# Patient Record
Sex: FEMALE | Race: WHITE | NOT HISPANIC OR LATINO | Employment: FULL TIME | URBAN - METROPOLITAN AREA
[De-identification: names, ages, dates, MRNs, and addresses within clinical notes are randomized per-mention and may not be internally consistent; named-entity substitution may affect disease eponyms.]

---

## 2023-09-05 ENCOUNTER — CONSULT (OUTPATIENT)
Dept: ONCOLOGY | Facility: CLINIC | Age: 68
End: 2023-09-05
Payer: COMMERCIAL

## 2023-09-05 ENCOUNTER — LAB (OUTPATIENT)
Dept: LAB | Facility: HOSPITAL | Age: 68
End: 2023-09-05
Payer: COMMERCIAL

## 2023-09-05 VITALS
HEIGHT: 61 IN | RESPIRATION RATE: 14 BRPM | TEMPERATURE: 98.7 F | SYSTOLIC BLOOD PRESSURE: 158 MMHG | HEART RATE: 84 BPM | BODY MASS INDEX: 20.01 KG/M2 | OXYGEN SATURATION: 99 % | DIASTOLIC BLOOD PRESSURE: 80 MMHG | WEIGHT: 106 LBS

## 2023-09-05 DIAGNOSIS — D64.89 ANEMIA DUE TO OTHER CAUSE, NOT CLASSIFIED: ICD-10-CM

## 2023-09-05 DIAGNOSIS — D64.89 ANEMIA DUE TO OTHER CAUSE, NOT CLASSIFIED: Primary | ICD-10-CM

## 2023-09-05 LAB
ALBUMIN SERPL-MCNC: 4.5 G/DL (ref 3.5–5.2)
ALBUMIN/GLOB SERPL: 1.4 G/DL
ALP SERPL-CCNC: 90 U/L (ref 39–117)
ALT SERPL W P-5'-P-CCNC: 13 U/L (ref 1–33)
ANION GAP SERPL CALCULATED.3IONS-SCNC: 10 MMOL/L (ref 5–15)
AST SERPL-CCNC: 21 U/L (ref 1–32)
B2 MICROGLOB SERPL-MCNC: 2.7 MG/L (ref 0.8–2.2)
BASOPHILS # BLD AUTO: 0.03 10*3/MM3 (ref 0–0.2)
BASOPHILS NFR BLD AUTO: 0.7 % (ref 0–1.5)
BILIRUB SERPL-MCNC: 0.2 MG/DL (ref 0–1.2)
BUN SERPL-MCNC: 13 MG/DL (ref 8–23)
BUN/CREAT SERPL: 16.3 (ref 7–25)
CALCIUM SPEC-SCNC: 9.6 MG/DL (ref 8.6–10.5)
CHLORIDE SERPL-SCNC: 108 MMOL/L (ref 98–107)
CO2 SERPL-SCNC: 25 MMOL/L (ref 22–29)
CREAT SERPL-MCNC: 0.8 MG/DL (ref 0.57–1)
DEPRECATED RDW RBC AUTO: 54 FL (ref 37–54)
EGFRCR SERPLBLD CKD-EPI 2021: 79.9 ML/MIN/1.73
EOSINOPHIL # BLD AUTO: 0.09 10*3/MM3 (ref 0–0.4)
EOSINOPHIL NFR BLD AUTO: 2 % (ref 0.3–6.2)
ERYTHROCYTE [DISTWIDTH] IN BLOOD BY AUTOMATED COUNT: 15.4 % (ref 12.3–15.4)
FERRITIN SERPL-MCNC: 46.31 NG/ML (ref 13–150)
FOLATE SERPL-MCNC: >20 NG/ML (ref 4.78–24.2)
GLOBULIN UR ELPH-MCNC: 3.3 GM/DL
GLUCOSE SERPL-MCNC: 94 MG/DL (ref 65–99)
HAPTOGLOB SERPL-MCNC: 71 MG/DL (ref 30–200)
HCT VFR BLD AUTO: 35.7 % (ref 34–46.6)
HGB BLD-MCNC: 11.1 G/DL (ref 12–15.9)
IMM GRANULOCYTES # BLD AUTO: 0.01 10*3/MM3 (ref 0–0.05)
IMM GRANULOCYTES NFR BLD AUTO: 0.2 % (ref 0–0.5)
IRON 24H UR-MRATE: 63 MCG/DL (ref 37–145)
IRON SATN MFR SERPL: 14 % (ref 20–50)
LDH SERPL-CCNC: 255 U/L (ref 135–214)
LYMPHOCYTES # BLD AUTO: 1.06 10*3/MM3 (ref 0.7–3.1)
LYMPHOCYTES NFR BLD AUTO: 23.7 % (ref 19.6–45.3)
MCH RBC QN AUTO: 29.3 PG (ref 26.6–33)
MCHC RBC AUTO-ENTMCNC: 31.1 G/DL (ref 31.5–35.7)
MCV RBC AUTO: 94.2 FL (ref 79–97)
MONOCYTES # BLD AUTO: 0.35 10*3/MM3 (ref 0.1–0.9)
MONOCYTES NFR BLD AUTO: 7.8 % (ref 5–12)
NEUTROPHILS NFR BLD AUTO: 2.93 10*3/MM3 (ref 1.7–7)
NEUTROPHILS NFR BLD AUTO: 65.6 % (ref 42.7–76)
PLATELET # BLD AUTO: 278 10*3/MM3 (ref 140–450)
PMV BLD AUTO: 10.9 FL (ref 6–12)
POTASSIUM SERPL-SCNC: 3.9 MMOL/L (ref 3.5–5.2)
PROT SERPL-MCNC: 7.8 G/DL (ref 6–8.5)
RBC # BLD AUTO: 3.79 10*6/MM3 (ref 3.77–5.28)
RETICS # AUTO: 0.04 10*6/MM3 (ref 0.02–0.13)
RETICS/RBC NFR AUTO: 0.99 % (ref 0.7–1.9)
SODIUM SERPL-SCNC: 143 MMOL/L (ref 136–145)
TIBC SERPL-MCNC: 453 MCG/DL (ref 298–536)
TRANSFERRIN SERPL-MCNC: 304 MG/DL (ref 200–360)
VIT B12 BLD-MCNC: 366 PG/ML (ref 211–946)
WBC NRBC COR # BLD: 4.47 10*3/MM3 (ref 3.4–10.8)

## 2023-09-05 PROCEDURE — 36415 COLL VENOUS BLD VENIPUNCTURE: CPT

## 2023-09-05 PROCEDURE — 84165 PROTEIN E-PHORESIS SERUM: CPT

## 2023-09-05 PROCEDURE — 82784 ASSAY IGA/IGD/IGG/IGM EACH: CPT

## 2023-09-05 PROCEDURE — 82746 ASSAY OF FOLIC ACID SERUM: CPT

## 2023-09-05 PROCEDURE — 83615 LACTATE (LD) (LDH) ENZYME: CPT

## 2023-09-05 PROCEDURE — 83010 ASSAY OF HAPTOGLOBIN QUANT: CPT

## 2023-09-05 PROCEDURE — 85060 BLOOD SMEAR INTERPRETATION: CPT

## 2023-09-05 PROCEDURE — 82232 ASSAY OF BETA-2 PROTEIN: CPT

## 2023-09-05 PROCEDURE — 82607 VITAMIN B-12: CPT

## 2023-09-05 PROCEDURE — 99204 OFFICE O/P NEW MOD 45 MIN: CPT | Performed by: INTERNAL MEDICINE

## 2023-09-05 PROCEDURE — 83540 ASSAY OF IRON: CPT

## 2023-09-05 PROCEDURE — 85025 COMPLETE CBC W/AUTO DIFF WBC: CPT

## 2023-09-05 PROCEDURE — 84466 ASSAY OF TRANSFERRIN: CPT

## 2023-09-05 PROCEDURE — 83521 IG LIGHT CHAINS FREE EACH: CPT

## 2023-09-05 PROCEDURE — 85045 AUTOMATED RETICULOCYTE COUNT: CPT

## 2023-09-05 PROCEDURE — 82728 ASSAY OF FERRITIN: CPT

## 2023-09-05 PROCEDURE — 86334 IMMUNOFIX E-PHORESIS SERUM: CPT

## 2023-09-05 PROCEDURE — 80053 COMPREHEN METABOLIC PANEL: CPT

## 2023-09-05 RX ORDER — HYDROXYZINE HYDROCHLORIDE 25 MG/1
25 TABLET, FILM COATED ORAL EVERY 12 HOURS SCHEDULED
COMMUNITY
Start: 2023-08-14

## 2023-09-05 RX ORDER — HYDROCHLOROTHIAZIDE 12.5 MG/1
25 TABLET ORAL DAILY PRN
COMMUNITY
Start: 2023-08-14

## 2023-09-05 RX ORDER — NALOXONE HYDROCHLORIDE 4 MG/.1ML
1 SPRAY NASAL AS NEEDED
COMMUNITY
Start: 2023-06-12

## 2023-09-05 RX ORDER — PRAVASTATIN SODIUM 10 MG
10 TABLET ORAL NIGHTLY
COMMUNITY

## 2023-09-05 RX ORDER — OXYCODONE HYDROCHLORIDE 5 MG/1
5 TABLET ORAL EVERY 8 HOURS PRN
COMMUNITY

## 2023-09-05 RX ORDER — ASPIRIN 81 MG/1
81 TABLET ORAL DAILY
COMMUNITY

## 2023-09-05 RX ORDER — LISINOPRIL AND HYDROCHLOROTHIAZIDE 20; 12.5 MG/1; MG/1
1 TABLET ORAL DAILY
COMMUNITY

## 2023-09-05 RX ORDER — GABAPENTIN 800 MG/1
1200 TABLET ORAL
COMMUNITY
Start: 2023-03-16

## 2023-09-05 RX ORDER — FUROSEMIDE 20 MG/1
20 TABLET ORAL DAILY
COMMUNITY
Start: 2023-08-30

## 2023-09-05 RX ORDER — POTASSIUM CHLORIDE 3 G/15ML
40 SOLUTION ORAL DAILY
COMMUNITY
Start: 2023-08-30

## 2023-09-05 NOTE — PROGRESS NOTES
New Patient Office Visit      Date: 2023     Patient Name: Linnette Luna  MRN: [id number]  : 1955  Referring Physician: Helene Gonzalez    Chief Complaint: Establish care for anemia    History of Present Illness: Linnette Luna is a pleasant 68 y.o. female past medical history of hypertension, hyperlipidemia who presents today for evaluation of anemia. The patient has been followed by the PCP who is monitoring CBCs which has been notable for mild anemia range between 7.5-10.0 over the past several months.  Iron levels have been around 47 and previously check vitamin B12 was within normal limits.  She does note some chronic fatigue but denies any worsening.  Denies any unexplained fevers, chills, night sweats, weight loss.  Denies any dark tarry stools or bright red blood per rectum.  States that she had a colonoscopy about 3 years ago which did not reveal any malignancies or bleeding lesions.  She does note a family history of colon cancer and is scheduled for repeat colonoscopy in 2 years.  She recently underwent a right knee replacement and is recovering well from this.  Denies any excessive bleeding from the procedure.  Otherwise she has no other major concerns or complaints and is compliant with her home medications    Oncology History:    Oncology/Hematology History    No history exists.       Subjective      Review of Systems:     Constitutional: Negative for fevers, chills, or weight loss  Eyes: Negative for blurred vision or discharge         Ear/Nose/Throat: Negative for difficulty swallowing, sore throat, LAD                                                       Respiratory: Negative for cough, SOA, wheezing                                                                                        Cardiovascular: Negative for chest pain or palpitations                                                                  Gastrointestinal: Negative for nausea, vomiting or diarrhea                                                                      Genitourinary: Negative for dysuria or hematuria                                                                                           Musculoskeletal: Negative for any joint pains or muscle aches                                                                        Neurologic: Negative for any weakness, headaches, dizziness                                                                         Hematologic: Negative for any easy bleeding or bruising                                                                                   Psychiatric: Negative for anxiety or depression                             Past Medical History: History reviewed. No pertinent past medical history.    Past Surgical History: History reviewed. No pertinent surgical history.    Family History: History reviewed. No pertinent family history.    Social History:   Social History     Socioeconomic History    Marital status:        Medications:     Current Outpatient Medications:     aspirin 81 MG EC tablet, Take 1 tablet by mouth Daily., Disp: , Rfl:     furosemide (LASIX) 20 MG tablet, Take 1 tablet by mouth Daily., Disp: , Rfl:     gabapentin (NEURONTIN) 800 MG tablet, Take 1.5 tablets by mouth every night at bedtime., Disp: , Rfl:     hydroCHLOROthiazide (HYDRODIURIL) 12.5 MG tablet, Take 2 tablets by mouth Daily As Needed., Disp: , Rfl:     hydrOXYzine (ATARAX) 25 MG tablet, Take 1 tablet by mouth Every 12 (Twelve) Hours., Disp: , Rfl:     lisinopril-hydrochlorothiazide (PRINZIDE,ZESTORETIC) 20-12.5 MG per tablet, Take 1 tablet by mouth Daily., Disp: , Rfl:     naloxone (NARCAN) 4 MG/0.1ML nasal spray, 1 spray into the nostril(s) as directed by provider As Needed., Disp: , Rfl:     oxyCODONE (ROXICODONE) 5 MG immediate release tablet, Take 1 tablet by mouth Every 8 (Eight) Hours As Needed., Disp: , Rfl:     Potassium Chloride 40 MEQ/15ML (20%) solution, Take 15 mL by mouth  "Daily., Disp: , Rfl:     pravastatin (PRAVACHOL) 10 MG tablet, Take 1 tablet by mouth Every Night., Disp: , Rfl:     Allergies:   Not on File    Objective     Physical Exam:  Vital Signs:   Vitals:    09/05/23 1137   BP: 158/80   Pulse: 84   Resp: 14   Temp: 98.7 °F (37.1 °C)   SpO2: 99%   Weight: 48.1 kg (106 lb)   Height: 154.9 cm (61\")   PainSc: 0-No pain     Pain Score    09/05/23 1137   PainSc: 0-No pain     ECOG Performance Status: 0 - Asymptomatic    Constitutional: NAD, ECOG 0  Eyes: PERRLA, scleral anicteric  ENT: No LAD, no thyromegaly  Respiratory: CTAB, no wheezing, rales, rhonchi  Cardiovascular: RRR, no murmurs, pulses 2+ bilaterally  Abdomen: soft, NT/ND, no HSM  Musculoskeletal: strength 5/5 bilaterally, no c/c/e  Neurologic: A&O x 3, CN II-XII intact grossly  Psych: mood and affect congruent, no SI or HI    Results Review:   No visits with results within 2 Week(s) from this visit.   Latest known visit with results is:   No results found for any previous visit.       No results found.    Assessment / Plan      Assessment/Plan:   1. Anemia due to other cause, not classified (Primary)  -Unclear etiology  -Hemoglobin range between 7.5-10 over the past several months  -Previously checked iron level and vitamin B12 within normal limits  -We will check labs as below to rule out other secondary causes  -Next due for colonoscopy in 2 years  -No indication for bone marrow biopsy at this time  -     CBC & Differential; Future  -     Comprehensive Metabolic Panel; Future  -     Ferritin; Future  -     Folate; Future  -     Vitamin B12; Future  -     Iron Profile; Future  -     Lactate Dehydrogenase; Future  -     Haptoglobin; Future  -     Peripheral Blood Smear; Future  -     Reticulocytes; Future  -     Beta 2 Microglobulin, Serum; Future  -     RAMONA + PE; Future  -     Immunoglobulin Free LT Chains Blood; Future           Follow Up:   Follow-up in 3 weeks     Frank Pedro MD  Hematology and Oncology "     Please note that portions of this note may have been completed with a voice recognition program. Efforts were made to edit the dictations, but occasionally words are mistranscribed.

## 2023-09-06 LAB
ALBUMIN SERPL ELPH-MCNC: 4 G/DL (ref 2.9–4.4)
ALBUMIN/GLOB SERPL: 1.3 {RATIO} (ref 0.7–1.7)
ALPHA1 GLOB SERPL ELPH-MCNC: 0.2 G/DL (ref 0–0.4)
ALPHA2 GLOB SERPL ELPH-MCNC: 0.6 G/DL (ref 0.4–1)
B-GLOBULIN SERPL ELPH-MCNC: 1 G/DL (ref 0.7–1.3)
CYTOLOGIST CVX/VAG CYTO: NORMAL
GAMMA GLOB SERPL ELPH-MCNC: 1.4 G/DL (ref 0.4–1.8)
GLOBULIN SER-MCNC: 3.2 G/DL (ref 2.2–3.9)
IGA SERPL-MCNC: 288 MG/DL (ref 87–352)
IGG SERPL-MCNC: 1241 MG/DL (ref 586–1602)
IGM SERPL-MCNC: 213 MG/DL (ref 26–217)
INTERPRETATION SERPL IEP-IMP: NORMAL
KAPPA LC FREE SER-MCNC: 19 MG/L (ref 3.3–19.4)
KAPPA LC FREE/LAMBDA FREE SER: 1.06 {RATIO} (ref 0.26–1.65)
LABORATORY COMMENT REPORT: NORMAL
LAMBDA LC FREE SERPL-MCNC: 18 MG/L (ref 5.7–26.3)
M PROTEIN SERPL ELPH-MCNC: NORMAL G/DL
PATH INTERP BLD-IMP: NORMAL
PROT SERPL-MCNC: 7.2 G/DL (ref 6–8.5)

## 2023-09-19 ENCOUNTER — OFFICE VISIT (OUTPATIENT)
Dept: CARDIOLOGY | Facility: CLINIC | Age: 68
End: 2023-09-19
Payer: COMMERCIAL

## 2023-09-19 VITALS
HEART RATE: 75 BPM | WEIGHT: 104 LBS | HEIGHT: 61 IN | DIASTOLIC BLOOD PRESSURE: 82 MMHG | BODY MASS INDEX: 19.63 KG/M2 | SYSTOLIC BLOOD PRESSURE: 138 MMHG | OXYGEN SATURATION: 98 %

## 2023-09-19 DIAGNOSIS — I10 ESSENTIAL HYPERTENSION: ICD-10-CM

## 2023-09-19 DIAGNOSIS — R06.09 DYSPNEA ON EXERTION: ICD-10-CM

## 2023-09-19 DIAGNOSIS — E78.2 MIXED HYPERLIPIDEMIA: Primary | ICD-10-CM

## 2023-09-19 PROCEDURE — 93000 ELECTROCARDIOGRAM COMPLETE: CPT | Performed by: INTERNAL MEDICINE

## 2023-09-19 PROCEDURE — 99204 OFFICE O/P NEW MOD 45 MIN: CPT | Performed by: INTERNAL MEDICINE

## 2023-09-19 RX ORDER — BACLOFEN 10 MG/1
1-2 TABLET ORAL DAILY
COMMUNITY
Start: 2016-01-17

## 2023-09-19 RX ORDER — OXYCODONE HYDROCHLORIDE 10 MG/1
10 TABLET ORAL
COMMUNITY
Start: 2006-08-17

## 2023-09-19 NOTE — PROGRESS NOTES
New Cardiology Patient Office Visit      Date: 2023  Patient Name: Linnette Luna  : 1955   MRN: [id number]   PCP: Helene Gonzalez APRN   Referring Provider: Helene Gonzalez APRN     Chief Complaint:    Chief Complaint   Patient presents with    Edema    Abnormal level of blood mineral       History of Present Illness: Linnette Luna is a 68 y.o. female who is here today for cardiac evaluation for possible valvular and ischemic disease.  Patient was doing fine and then had a knee replacement and was found to have a  very low blood pressure.  Patient was worked up for pulmonary embolism and DVT and all was negative.  She was taken off of her blood pressure medications.  She starting having lower extremity edema more on the right than on the left.  Since he has started doing exercise again she is starting feeling short of breath.  She denies any particular chest pain.  She denies any paroxysmal nocturnal dyspnea or any other symptoms.      Problem List   CARDIAC    a.Coronary Artery Disease:   i.Asymptomatic     b.Myocardium:   i.Apparently Normal     c.Valvular:   i.MVP?      d.Electrical:   i.NSR     e.Percardium:   i.Normal       CARDIAC RISK FACTORS:  Hypertension  Dyslipidemia  Tobacco Use: Former Smoker    NON-CARDIAC:  Iron deficiency anemia    SURGERIES:  Left knee replacement    Subjective      Review of Systems:   Review of Systems   Respiratory:  Positive for shortness of breath.      Medications:   Current Outpatient Medications   Medication Sig Dispense Refill    baclofen (LIORESAL) 10 MG tablet Take 1-2 tablets by mouth Daily.      furosemide (LASIX) 20 MG tablet Take 1 tablet by mouth Daily.      gabapentin (NEURONTIN) 800 MG tablet Take 1.5 tablets by mouth every night at bedtime.      hydrOXYzine (ATARAX) 25 MG tablet Take 1-2 mg by mouth Every 12 (Twelve) Hours.      naloxone (NARCAN) 4 MG/0.1ML nasal spray 1 spray into the nostril(s) as directed by provider As Needed.       "oxyCODONE (ROXICODONE) 10 MG tablet Take 1 tablet by mouth Every 3 (Three) Hours As Needed.      Potassium Chloride 40 MEQ/15ML (20%) solution Take 15 mL by mouth Daily.      pravastatin (PRAVACHOL) 10 MG tablet Take 1 tablet by mouth Every Night.       No current facility-administered medications for this visit.           The following portions of the patient's history were reviewed and updated as appropriate: allergies, current medications, past family history, past medical history, past social history, past surgical history and problem list.    Objective     Physical Exam:  Vital Signs:   Vitals:    09/19/23 0930   BP: 138/82   BP Location: Right arm   Patient Position: Sitting   Pulse: 75   SpO2: 98%   Weight: 47.2 kg (104 lb)   Height: 154.9 cm (61\")     Body mass index is 19.65 kg/m².     Constitutional:       General: Not in acute distress.     Appearance: Healthy appearance. Not in distress.     Neck:     JVP: Not elevated     Carotid artery: No carotid bruit    Pulmonary:      Effort: Pulmonary effort is normal.      Breath sounds: Normal breath sounds. No wheezing. No rhonchi. No rales.     Cardiovascular:      Normal rate. Regular rhythm. Normal S1. Normal S2.      Murmurs: There is no murmur.      No gallop. No click. No rub.     Abdominal:      General: Bowel sounds are normal.      Palpations: Abdomen is soft.      Tenderness: There is no abdominal tenderness.    Extremities:     Pulses: Good distal pulses     Edema: No edema    Labs:  Lab Results   Component Value Date    GLUCOSE 94 09/05/2023    BUN 13 09/05/2023    CREATININE 0.80 09/05/2023    BCR 16.3 09/05/2023    K 3.9 09/05/2023    CO2 25.0 09/05/2023    CALCIUM 9.6 09/05/2023    PROTENTOTREF 7.2 09/05/2023    ALBUMIN 4.5 09/05/2023    ALBUMIN 4.0 09/05/2023    LABIL2 1.3 09/05/2023    AST 21 09/05/2023    ALT 13 09/05/2023     Lab Results   Component Value Date    WBC 4.47 09/05/2023    HGB 11.1 (L) 09/05/2023    HCT 35.7 09/05/2023    MCV " 94.2 09/05/2023     09/05/2023         Lab Results   Component Value Date    HGBA1C 5.9 (H) 05/30/2023             ECG 12 Lead    Date/Time: 9/19/2023 12:12 PM  Performed by: Sohail Palencia MD  Authorized by: Sohail Palencia MD   Previous ECG: no previous ECG available  Rhythm: sinus rhythm  Ectopy: unifocal PVCs    Clinical impression: abnormal EKG        Smoking Cessation:   She quit smoking long time ago.    Assessment / Plan      Assessment & Plan    Assessment:   Diagnosis Plan   1. Mixed hyperlipidemia        2. Essential hypertension        3. Dyspnea on exertion  Adult Transthoracic Echo Complete W/ Cont if Necessary Per Protocol    Stress Test With Myocardial Perfusion One Day           Plan:  Patient has multiple risk factor for coronary artery disease.  We will schedule for echo and a stress test.  She is going to continue on Lasix for her lower extremity edema.  If all her test are negative we might do venous duplex study for reflux.  I have advised her to keep holding her blood pressure medications at this time.  She cannot continue her Pravachol for now.            Follow Up:   Return in about 1 year (around 9/19/2024).    Sohail Palencia MD

## 2023-09-26 ENCOUNTER — OFFICE VISIT (OUTPATIENT)
Dept: ONCOLOGY | Facility: CLINIC | Age: 68
End: 2023-09-26
Payer: COMMERCIAL

## 2023-09-26 VITALS
WEIGHT: 106 LBS | OXYGEN SATURATION: 98 % | TEMPERATURE: 98.7 F | SYSTOLIC BLOOD PRESSURE: 169 MMHG | DIASTOLIC BLOOD PRESSURE: 79 MMHG | BODY MASS INDEX: 20.01 KG/M2 | HEIGHT: 61 IN | RESPIRATION RATE: 14 BRPM | HEART RATE: 74 BPM

## 2023-09-26 DIAGNOSIS — D64.89 ANEMIA DUE TO OTHER CAUSE, NOT CLASSIFIED: Primary | ICD-10-CM

## 2023-09-26 PROCEDURE — 99214 OFFICE O/P EST MOD 30 MIN: CPT | Performed by: INTERNAL MEDICINE

## 2023-09-26 RX ORDER — CYCLOSPORINE 0 G/ML
1 SOLUTION/ DROPS OPHTHALMIC; TOPICAL 2 TIMES DAILY
COMMUNITY
Start: 2023-09-25

## 2023-09-26 NOTE — PROGRESS NOTES
Follow Up Office Visit      Date: 2023     Patient Name: Linnette Luna  MRN: [id number]  : 1955  Referring Physician: Helene Gonzalez     Chief Complaint: Follow-up for anemia     History of Present Illness: Linnette Luna is a pleasant 68 y.o. female past medical history of hypertension, hyperlipidemia who presents today for evaluation of anemia. The patient has been followed by the PCP who is monitoring CBCs which has been notable for mild anemia range between 7.5-10.0 over the past several months.  Iron levels have been around 47 and previously check vitamin B12 was within normal limits.  She does note some chronic fatigue but denies any worsening.  Denies any unexplained fevers, chills, night sweats, weight loss.  Denies any dark tarry stools or bright red blood per rectum.  States that she had a colonoscopy about 3 years ago which did not reveal any malignancies or bleeding lesions.  She does note a family history of colon cancer and is scheduled for repeat colonoscopy in 2 years.  She recently underwent a right knee replacement and is recovering well from this.  Denies any excessive bleeding from the procedure.  Otherwise she has no other major concerns or complaints and is compliant with her home medications    Interval History:  Presents to clinic for follow-up.  No major concerns or complaints today.  Denies any worsening fatigue or tiredness.    Oncology History:    Oncology/Hematology History    No history exists.       Subjective      Review of Systems:   Constitutional: Negative for fevers, chills, or weight loss  Eyes: Negative for blurred vision or discharge         Ear/Nose/Throat: Negative for difficulty swallowing, sore throat, LAD                                                       Respiratory: Negative for cough, SOA, wheezing                                                                                        Cardiovascular: Negative for chest pain or palpitations                                                                   Gastrointestinal: Negative for nausea, vomiting or diarrhea                                                                     Genitourinary: Negative for dysuria or hematuria                                                                                           Musculoskeletal: Negative for any joint pains or muscle aches                                                                        Neurologic: Negative for any weakness, headaches, dizziness                                                                         Hematologic: Negative for any easy bleeding or bruising                                                                                   Psychiatric: Negative for anxiety or depression                          Past Medical History/Past Surgical History/ Family History/ Social History: Reviewed by me and unchanged from my previous documentation done on September 2023.     Medications:     Current Outpatient Medications:     baclofen (LIORESAL) 10 MG tablet, Take 1-2 tablets by mouth Daily., Disp: , Rfl:     Cequa 0.09 % solution, Apply 1 drop to eye(s) as directed by provider 2 (Two) Times a Day., Disp: , Rfl:     furosemide (LASIX) 20 MG tablet, Take 1 tablet by mouth Daily., Disp: , Rfl:     gabapentin (NEURONTIN) 800 MG tablet, Take 1.5 tablets by mouth every night at bedtime., Disp: , Rfl:     hydrOXYzine (ATARAX) 25 MG tablet, Take 1-2 mg by mouth Every 12 (Twelve) Hours., Disp: , Rfl:     naloxone (NARCAN) 4 MG/0.1ML nasal spray, 1 spray into the nostril(s) as directed by provider As Needed., Disp: , Rfl:     oxyCODONE (ROXICODONE) 10 MG tablet, Take 1 tablet by mouth Every 3 (Three) Hours As Needed., Disp: , Rfl:     Potassium Chloride 40 MEQ/15ML (20%) solution, Take 15 mL by mouth Daily., Disp: , Rfl:     pravastatin (PRAVACHOL) 10 MG tablet, Take 1 tablet by mouth Every Night., Disp: , Rfl:     Allergies:   Allergies   Allergen  "Reactions    Carbamazepine Rash and Unknown - High Severity    Amitriptyline Other (See Comments)    Bupropion Rash and Unknown - High Severity    Hydromorphone Rash and Unknown - High Severity     Tolerated IV, rash broke out with PO form    Lamotrigine Unknown - Low Severity, Rash and Unknown - High Severity    Levetiracetam Unknown - Low Severity and Rash    Oxcarbazepine Unknown - Low Severity, Rash and Unknown - High Severity    Penicillins Unknown - High Severity and Unknown (See Comments)     Was positive on allergy test, never taken    Sulfa Antibiotics Rash    Tolmetin Rash and Unknown - High Severity    Valproic Acid Unknown - Low Severity, Other (See Comments) and Unknown - High Severity     Visual changes, flashing lights       Objective     Physical Exam:  Vital Signs:   Vitals:    09/26/23 1103   BP: 169/79   Pulse: 74   Resp: 14   Temp: 98.7 °F (37.1 °C)   SpO2: 98%   Weight: 48.1 kg (106 lb)   Height: 154.9 cm (61\")   PainSc: 0-No pain     Pain Score    09/26/23 1103   PainSc: 0-No pain     ECOG Performance Status: 0 - Asymptomatic    Constitutional: NAD, ECOG 0  Eyes: PERRLA, scleral anicteric  ENT: No LAD, no thyromegaly  Respiratory: CTAB, no wheezing, rales, rhonchi  Cardiovascular: RRR, no murmurs, pulses 2+ bilaterally  Abdomen: soft, NT/ND, no HSM  Musculoskeletal: strength 5/5 bilaterally, no c/c/e  Neurologic: A&O x 3, CN II-XII intact grossly    Results Review:   No visits with results within 2 Week(s) from this visit.   Latest known visit with results is:   Lab on 09/05/2023   Component Date Value Ref Range Status    Glucose 09/05/2023 94  65 - 99 mg/dL Final    BUN 09/05/2023 13  8 - 23 mg/dL Final    Creatinine 09/05/2023 0.80  0.57 - 1.00 mg/dL Final    Sodium 09/05/2023 143  136 - 145 mmol/L Final    Potassium 09/05/2023 3.9  3.5 - 5.2 mmol/L Final    Chloride 09/05/2023 108 (H)  98 - 107 mmol/L Final    CO2 09/05/2023 25.0  22.0 - 29.0 mmol/L Final    Calcium 09/05/2023 9.6  8.6 - " 10.5 mg/dL Final    Total Protein 09/05/2023 7.8  6.0 - 8.5 g/dL Final    Albumin 09/05/2023 4.5  3.5 - 5.2 g/dL Final    ALT (SGPT) 09/05/2023 13  1 - 33 U/L Final    AST (SGOT) 09/05/2023 21  1 - 32 U/L Final    Alkaline Phosphatase 09/05/2023 90  39 - 117 U/L Final    Total Bilirubin 09/05/2023 0.2  0.0 - 1.2 mg/dL Final    Globulin 09/05/2023 3.3  gm/dL Final    Calculated Result    A/G Ratio 09/05/2023 1.4  g/dL Final    BUN/Creatinine Ratio 09/05/2023 16.3  7.0 - 25.0 Final    Anion Gap 09/05/2023 10.0  5.0 - 15.0 mmol/L Final    eGFR 09/05/2023 79.9  >60.0 mL/min/1.73 Final    Ferritin 09/05/2023 46.31  13.00 - 150.00 ng/mL Final    Folate 09/05/2023 >20.00  4.78 - 24.20 ng/mL Final    Vitamin B-12 09/05/2023 366  211 - 946 pg/mL Final    Iron 09/05/2023 63  37 - 145 mcg/dL Final    Iron Saturation (TSAT) 09/05/2023 14 (L)  20 - 50 % Final    Transferrin 09/05/2023 304  200 - 360 mg/dL Final    TIBC 09/05/2023 453  298 - 536 mcg/dL Final    LDH 09/05/2023 255 (H)  135 - 214 U/L Final    Haptoglobin 09/05/2023 71  30 - 200 mg/dL Final    Performed by: 09/05/2023 Dr. Narvaez   Final    Pathologist Interpretation 09/05/2023 Mild normochromic microcytic anemia with unremarkable red blood cells.  No schistocytes identified.  No poikilocytosis identified.  Normal white count with no immature cells identified.  Normal platelets.   Final    Reticulocyte % 09/05/2023 0.99  0.70 - 1.90 % Final    Reticulocyte Absolute 09/05/2023 0.0385  0.0200 - 0.1300 10*6/mm3 Final    Beta-2 Microglobulin 09/05/2023 2.7 (H)  0.8 - 2.2 mg/L Final    IgG 09/05/2023 1241  586 - 1602 mg/dL Final    IgA 09/05/2023 288  87 - 352 mg/dL Final    IgM 09/05/2023 213  26 - 217 mg/dL Final    Total Protein 09/05/2023 7.2  6.0 - 8.5 g/dL Final    Albumin 09/05/2023 4.0  2.9 - 4.4 g/dL Final    Alpha-1-Globulin 09/05/2023 0.2  0.0 - 0.4 g/dL Final    Alpha-2-Globulin 09/05/2023 0.6  0.4 - 1.0 g/dL Final    Beta Globulin 09/05/2023 1.0  0.7 - 1.3  g/dL Final    Gamma Globulin 09/05/2023 1.4  0.4 - 1.8 g/dL Final    M-Dilip 09/05/2023 Not Observed  Not Observed g/dL Final    Globulin 09/05/2023 3.2  2.2 - 3.9 g/dL Final    A/G Ratio 09/05/2023 1.3  0.7 - 1.7 Final    Immunofixation Reflex, Serum 09/05/2023 Comment   Final    No monoclonality detected.    Please note 09/05/2023 Comment   Final    Protein electrophoresis scan will follow via computer, mail, or   delivery.    Free Light Chain, Kappa 09/05/2023 19.0  3.3 - 19.4 mg/L Final    Free Lambda Light Chains 09/05/2023 18.0  5.7 - 26.3 mg/L Final    Kappa/Lambda Ratio 09/05/2023 1.06  0.26 - 1.65 Final    WBC 09/05/2023 4.47  3.40 - 10.80 10*3/mm3 Final    RBC 09/05/2023 3.79  3.77 - 5.28 10*6/mm3 Final    Hemoglobin 09/05/2023 11.1 (L)  12.0 - 15.9 g/dL Final    Hematocrit 09/05/2023 35.7  34.0 - 46.6 % Final    MCV 09/05/2023 94.2  79.0 - 97.0 fL Final    MCH 09/05/2023 29.3  26.6 - 33.0 pg Final    MCHC 09/05/2023 31.1 (L)  31.5 - 35.7 g/dL Final    RDW 09/05/2023 15.4  12.3 - 15.4 % Final    RDW-SD 09/05/2023 54.0  37.0 - 54.0 fl Final    MPV 09/05/2023 10.9  6.0 - 12.0 fL Final    Platelets 09/05/2023 278  140 - 450 10*3/mm3 Final    Neutrophil % 09/05/2023 65.6  42.7 - 76.0 % Final    Lymphocyte % 09/05/2023 23.7  19.6 - 45.3 % Final    Monocyte % 09/05/2023 7.8  5.0 - 12.0 % Final    Eosinophil % 09/05/2023 2.0  0.3 - 6.2 % Final    Basophil % 09/05/2023 0.7  0.0 - 1.5 % Final    Immature Grans % 09/05/2023 0.2  0.0 - 0.5 % Final    Neutrophils, Absolute 09/05/2023 2.93  1.70 - 7.00 10*3/mm3 Final    Lymphocytes, Absolute 09/05/2023 1.06  0.70 - 3.10 10*3/mm3 Final    Monocytes, Absolute 09/05/2023 0.35  0.10 - 0.90 10*3/mm3 Final    Eosinophils, Absolute 09/05/2023 0.09  0.00 - 0.40 10*3/mm3 Final    Basophils, Absolute 09/05/2023 0.03  0.00 - 0.20 10*3/mm3 Final    Immature Grans, Absolute 09/05/2023 0.01  0.00 - 0.05 10*3/mm3 Final       No results found.    Assessment / Plan       Assessment/Plan:   1. Anemia due to other cause, not classified (Primary)  -Unclear etiology  -Hemoglobin range between 7.5-10 over the past several months  -Repeat hemoglobin 11.1 in September 2023  -LDH mildly elevated, haptoglobin within normal limits, peripheral smear with no schistocytes indicating no hemolysis  -Vitamin B12 and folate within normal limits  -Iron studies not consistent with iron deficiency anemia  -Next due for colonoscopy in 2 years  -No indication for bone marrow biopsy at this time  -As she is asymptomatic, no indication for further hematologic work-up.  Should her platelet count or white blood cell count significantly drop then would consider bone marrow biopsy at that time         Follow Up:   Follow-up as needed       Frank Pedro MD  Hematology and Oncology     Please note that portions of this note may have been completed with a voice recognition program. Efforts were made to edit the dictations, but occasionally words are mistranscribed.

## 2023-11-07 ENCOUNTER — HOSPITAL ENCOUNTER (OUTPATIENT)
Dept: CARDIOLOGY | Facility: HOSPITAL | Age: 68
End: 2023-11-07
Payer: COMMERCIAL

## 2023-11-07 ENCOUNTER — HOSPITAL ENCOUNTER (OUTPATIENT)
Dept: CARDIOLOGY | Facility: HOSPITAL | Age: 68
Discharge: HOME | End: 2023-11-07
Admitting: INTERNAL MEDICINE
Payer: COMMERCIAL

## 2023-11-07 VITALS
DIASTOLIC BLOOD PRESSURE: 78 MMHG | BODY MASS INDEX: 19.98 KG/M2 | WEIGHT: 105.82 LBS | SYSTOLIC BLOOD PRESSURE: 131 MMHG | HEIGHT: 61 IN

## 2023-11-07 DIAGNOSIS — R06.09 DYSPNEA ON EXERTION: ICD-10-CM

## 2023-11-07 PROCEDURE — 93306 TTE W/DOPPLER COMPLETE: CPT

## 2023-11-08 LAB
ASCENDING AORTA: 2.4 CM
BH CV ECHO MEAS - AI P1/2T: 432.1 MSEC
BH CV ECHO MEAS - AO MAX PG: 5.5 MMHG
BH CV ECHO MEAS - AO MEAN PG: 3 MMHG
BH CV ECHO MEAS - AO ROOT DIAM: 2.6 CM
BH CV ECHO MEAS - AO V2 MAX: 116.9 CM/SEC
BH CV ECHO MEAS - AO V2 VTI: 27.9 CM
BH CV ECHO MEAS - AVA(I,D): 1.77 CM2
BH CV ECHO MEAS - EDV(CUBED): 32.6 ML
BH CV ECHO MEAS - EDV(MOD-SP2): 65.4 ML
BH CV ECHO MEAS - EDV(MOD-SP4): 74.8 ML
BH CV ECHO MEAS - EF(MOD-BP): 48 %
BH CV ECHO MEAS - EF(MOD-SP2): 49.5 %
BH CV ECHO MEAS - EF(MOD-SP4): 43.9 %
BH CV ECHO MEAS - ESV(CUBED): 10.6 ML
BH CV ECHO MEAS - ESV(MOD-SP2): 33.1 ML
BH CV ECHO MEAS - ESV(MOD-SP4): 41.9 ML
BH CV ECHO MEAS - FS: 31.2 %
BH CV ECHO MEAS - IVS/LVPW: 0.41 CM
BH CV ECHO MEAS - IVSD: 0.7 CM
BH CV ECHO MEAS - LA DIMENSION: 3.2 CM
BH CV ECHO MEAS - LAT PEAK E' VEL: 5 CM/SEC
BH CV ECHO MEAS - LV DIASTOLIC VOL/BSA (35-75): 51.9 CM2
BH CV ECHO MEAS - LV MASS(C)D: 118.4 GRAMS
BH CV ECHO MEAS - LV MAX PG: 1.55 MMHG
BH CV ECHO MEAS - LV MEAN PG: 0.87 MMHG
BH CV ECHO MEAS - LV SYSTOLIC VOL/BSA (12-30): 29.1 CM2
BH CV ECHO MEAS - LV V1 MAX: 62.3 CM/SEC
BH CV ECHO MEAS - LV V1 VTI: 16.1 CM
BH CV ECHO MEAS - LVIDD: 3.2 CM
BH CV ECHO MEAS - LVIDS: 2.2 CM
BH CV ECHO MEAS - LVOT AREA: 3.1 CM2
BH CV ECHO MEAS - LVOT DIAM: 1.98 CM
BH CV ECHO MEAS - LVPWD: 1.69 CM
BH CV ECHO MEAS - MED PEAK E' VEL: 6 CM/SEC
BH CV ECHO MEAS - MV A MAX VEL: 72.7 CM/SEC
BH CV ECHO MEAS - MV DEC SLOPE: 542.6 CM/SEC2
BH CV ECHO MEAS - MV DEC TIME: 0.16 SEC
BH CV ECHO MEAS - MV E MAX VEL: 88.4 CM/SEC
BH CV ECHO MEAS - MV E/A: 1.22
BH CV ECHO MEAS - MV MAX PG: 4.3 MMHG
BH CV ECHO MEAS - MV MEAN PG: 1.89 MMHG
BH CV ECHO MEAS - MV V2 VTI: 33.8 CM
BH CV ECHO MEAS - MVA(VTI): 1.46 CM2
BH CV ECHO MEAS - PA ACC TIME: 0.15 SEC
BH CV ECHO MEAS - PA V2 MAX: 74.9 CM/SEC
BH CV ECHO MEAS - PI END-D VEL: 100.2 CM/SEC
BH CV ECHO MEAS - RAP SYSTOLE: 8 MMHG
BH CV ECHO MEAS - RVSP: 29 MMHG
BH CV ECHO MEAS - SI(MOD-SP2): 22.5 ML/M2
BH CV ECHO MEAS - SI(MOD-SP4): 22.8 ML/M2
BH CV ECHO MEAS - SV(LVOT): 49.2 ML
BH CV ECHO MEAS - SV(MOD-SP2): 32.4 ML
BH CV ECHO MEAS - SV(MOD-SP4): 32.9 ML
BH CV ECHO MEAS - TAPSE (>1.6): 2.6 CM
BH CV ECHO MEAS - TR MAX PG: 21.5 MMHG
BH CV ECHO MEAS - TR MAX VEL: 226.6 CM/SEC
BH CV ECHO MEASUREMENTS AVERAGE E/E' RATIO: 16.07
BH CV XLRA - RV BASE: 3.4 CM
BH CV XLRA - RV LENGTH: 4.9 CM
BH CV XLRA - RV MID: 1.7 CM
BH CV XLRA - TDI S': 12 CM/SEC
IVRT: 94 MS
LEFT ATRIUM VOLUME INDEX: 35 ML/M2

## 2023-11-13 ENCOUNTER — TELEPHONE (OUTPATIENT)
Dept: CARDIOLOGY | Facility: CLINIC | Age: 68
End: 2023-11-13
Payer: COMMERCIAL

## 2023-11-13 NOTE — TELEPHONE ENCOUNTER
Spoke with patient about abnormal results, pt verbalizes understanding. Will try to get her in for her stress test sooner. Patient is not symptomatic at this time.

## 2023-11-13 NOTE — TELEPHONE ENCOUNTER
----- Message from Sohail Palencia MD sent at 11/11/2023  1:21 PM EST -----  Please call the patient regarding her abnormal result.  Her ejection fraction is low normal.  She does have valve leaking.  When is her stress test scheduled

## 2023-12-11 ENCOUNTER — HOSPITAL ENCOUNTER (OUTPATIENT)
Dept: CARDIOLOGY | Facility: HOSPITAL | Age: 68
Discharge: HOME | End: 2023-12-11
Admitting: INTERNAL MEDICINE
Payer: COMMERCIAL

## 2023-12-11 VITALS
BODY MASS INDEX: 19.83 KG/M2 | SYSTOLIC BLOOD PRESSURE: 132 MMHG | WEIGHT: 105 LBS | HEIGHT: 61 IN | HEART RATE: 73 BPM | DIASTOLIC BLOOD PRESSURE: 80 MMHG

## 2023-12-11 DIAGNOSIS — R06.09 DYSPNEA ON EXERTION: ICD-10-CM

## 2023-12-11 LAB
BH CV REST NUCLEAR ISOTOPE DOSE: 9.5 MCI
BH CV STRESS BP STAGE 1: NORMAL
BH CV STRESS BP STAGE 2: NORMAL
BH CV STRESS BP STAGE 3: NORMAL
BH CV STRESS DURATION MIN STAGE 1: 3
BH CV STRESS DURATION MIN STAGE 2: 3
BH CV STRESS DURATION MIN STAGE 3: 1
BH CV STRESS DURATION SEC STAGE 1: 0
BH CV STRESS DURATION SEC STAGE 2: 0
BH CV STRESS DURATION SEC STAGE 3: 4
BH CV STRESS GRADE STAGE 1: 10
BH CV STRESS GRADE STAGE 2: 12
BH CV STRESS GRADE STAGE 3: 14
BH CV STRESS HR STAGE 1: 112
BH CV STRESS HR STAGE 2: 133
BH CV STRESS HR STAGE 3: 151
BH CV STRESS METS STAGE 1: 5
BH CV STRESS METS STAGE 2: 7.5
BH CV STRESS METS STAGE 3: 10
BH CV STRESS NUCLEAR ISOTOPE DOSE: 33 MCI
BH CV STRESS O2 STAGE 1: 100
BH CV STRESS O2 STAGE 2: 98
BH CV STRESS O2 STAGE 3: 100
BH CV STRESS PROTOCOL 1: NORMAL
BH CV STRESS RECOVERY BP: NORMAL MMHG
BH CV STRESS RECOVERY HR: 78 BPM
BH CV STRESS RECOVERY O2: 100 %
BH CV STRESS SPEED STAGE 1: 1.7
BH CV STRESS SPEED STAGE 2: 2.5
BH CV STRESS SPEED STAGE 3: 3.4
BH CV STRESS STAGE 1: 1
BH CV STRESS STAGE 2: 2
BH CV STRESS STAGE 3: 3
LV EF NUC BP: 52 %
MAXIMAL PREDICTED HEART RATE: 151 BPM
PERCENT MAX PREDICTED HR: 101.32 %
STRESS BASELINE BP: NORMAL MMHG
STRESS BASELINE HR: 73 BPM
STRESS O2 SAT REST: 95 %
STRESS PERCENT HR: 119 %
STRESS POST ESTIMATED WORKLOAD: 8.6 METS
STRESS POST EXERCISE DUR MIN: 7 MIN
STRESS POST EXERCISE DUR SEC: 4 SEC
STRESS POST O2 SAT PEAK: 100 %
STRESS POST PEAK BP: NORMAL MMHG
STRESS POST PEAK HR: 153 BPM
STRESS TARGET HR: 128 BPM

## 2023-12-11 PROCEDURE — 93017 CV STRESS TEST TRACING ONLY: CPT

## 2023-12-11 PROCEDURE — A9500 TC99M SESTAMIBI: HCPCS | Performed by: INTERNAL MEDICINE

## 2023-12-11 PROCEDURE — 78452 HT MUSCLE IMAGE SPECT MULT: CPT

## 2023-12-11 PROCEDURE — 0 TECHNETIUM SESTAMIBI: Performed by: INTERNAL MEDICINE

## 2023-12-11 PROCEDURE — 93018 CV STRESS TEST I&R ONLY: CPT | Performed by: INTERNAL MEDICINE

## 2023-12-11 PROCEDURE — 78452 HT MUSCLE IMAGE SPECT MULT: CPT | Performed by: INTERNAL MEDICINE

## 2023-12-11 RX ADMIN — TECHNETIUM TC 99M SESTAMIBI 1 DOSE: 1 INJECTION INTRAVENOUS at 10:35

## 2023-12-11 RX ADMIN — TECHNETIUM TC 99M SESTAMIBI 1 DOSE: 1 INJECTION INTRAVENOUS at 08:45

## 2023-12-14 ENCOUNTER — TELEPHONE (OUTPATIENT)
Dept: CARDIOLOGY | Facility: CLINIC | Age: 68
End: 2023-12-14
Payer: COMMERCIAL

## 2023-12-14 DIAGNOSIS — R06.02 SOB (SHORTNESS OF BREATH): ICD-10-CM

## 2023-12-14 DIAGNOSIS — I50.9 CONGESTIVE HEART FAILURE, UNSPECIFIED HF CHRONICITY, UNSPECIFIED HEART FAILURE TYPE: Primary | ICD-10-CM

## 2023-12-14 NOTE — TELEPHONE ENCOUNTER
Per MA, get ProBNP and chest xray d/t pt being SOA and recent EF.     Stress test normal,    Spoke with patient regarding results and plan of care, patient verbalizes understanding.    Sending orders to [facility].

## 2023-12-14 NOTE — TELEPHONE ENCOUNTER
----- Message from Sohail Palencia MD sent at 12/13/2023  4:24 PM EST -----  Your stress test is normal.

## 2023-12-21 ENCOUNTER — TELEPHONE (OUTPATIENT)
Dept: CARDIOLOGY | Facility: CLINIC | Age: 68
End: 2023-12-21
Payer: COMMERCIAL

## 2023-12-21 NOTE — TELEPHONE ENCOUNTER
----- Message from Sohail Palencia MD sent at 12/21/2023  8:33 AM EST -----  Your chest x-ray is normal.

## 2024-01-12 ENCOUNTER — TELEPHONE (OUTPATIENT)
Dept: CARDIOLOGY | Facility: CLINIC | Age: 69
End: 2024-01-12
Payer: COMMERCIAL

## 2024-01-12 NOTE — TELEPHONE ENCOUNTER
Received BNP from outside clinic today, BNP was 1973 per MA wants to start Entresto 24-26mg BID and farxiga 10mg daily.    Spoke with patient, made patient aware of BNP, educated pt on BNP levels and new medication start.    Made patient appointment for Thursday 1-18-24.    Patient verbalizes understanding.

## 2024-01-18 ENCOUNTER — TELEPHONE (OUTPATIENT)
Dept: CARDIOLOGY | Facility: CLINIC | Age: 69
End: 2024-01-18

## 2024-01-18 ENCOUNTER — OFFICE VISIT (OUTPATIENT)
Dept: CARDIOLOGY | Facility: CLINIC | Age: 69
End: 2024-01-18
Payer: MEDICARE

## 2024-01-18 VITALS
HEART RATE: 92 BPM | HEIGHT: 61 IN | OXYGEN SATURATION: 97 % | DIASTOLIC BLOOD PRESSURE: 70 MMHG | WEIGHT: 111.2 LBS | BODY MASS INDEX: 20.99 KG/M2 | SYSTOLIC BLOOD PRESSURE: 130 MMHG

## 2024-01-18 DIAGNOSIS — I10 ESSENTIAL HYPERTENSION: ICD-10-CM

## 2024-01-18 DIAGNOSIS — I50.9 CONGESTIVE HEART FAILURE, UNSPECIFIED HF CHRONICITY, UNSPECIFIED HEART FAILURE TYPE: Primary | ICD-10-CM

## 2024-01-18 PROCEDURE — 99214 OFFICE O/P EST MOD 30 MIN: CPT | Performed by: INTERNAL MEDICINE

## 2024-01-18 RX ORDER — LISINOPRIL 10 MG/1
10 TABLET ORAL DAILY
Qty: 30 TABLET | Refills: 11 | Status: SHIPPED | OUTPATIENT
Start: 2024-01-18

## 2024-01-18 RX ORDER — POTASSIUM CHLORIDE 750 MG/1
10 TABLET, FILM COATED, EXTENDED RELEASE ORAL DAILY
Qty: 30 TABLET | Refills: 11 | Status: SHIPPED | OUTPATIENT
Start: 2024-01-18

## 2024-01-18 RX ORDER — FUROSEMIDE 20 MG/1
20 TABLET ORAL DAILY
Qty: 30 TABLET | Refills: 11 | Status: SHIPPED | OUTPATIENT
Start: 2024-01-18

## 2024-01-18 NOTE — PROGRESS NOTES
Follow-up Visit      Date: 2024  Patient Name: Linnette Luna  : 1955   MRN: [id number]     Chief Complaint:    Chief Complaint   Patient presents with    Congestive Heart Failure    dyspnea on exertion       History of Present Illness: Linnette Luna is a 68 y.o. female who is here today for follow-up on her heart failure.  Patient starting having more shortness of breath and in the process of evaluation was found to have a high BNP.  Patient was started on Farxiga and Entresto and unfortunately she cannot afford those medications.  Her shortness of breath is a little better from before.  She denies any chest pain any lower extremity edema.  She is able to do most of her activities.      Problem List     CARDIAC  Coronary Artery Disease:   2023 Normal stress test  Dyspnea    Myocardium:   Echo, 2023: LVEF 46 to 50%, G2 DD, LVH, stage C, NYHA class II     Valvular:   Moderate AI     Electrical:   NSR     Percardium:   Normal       CARDIAC RISK FACTORS:  Hypertension  Dyslipidemia  Tobacco Use: Former Smoker    NON-CARDIAC:  Iron deficiency anemia    SURGERIES:  Left knee replacement        Subjective      Review of Systems:   Review of Systems   Respiratory:  Positive for shortness of breath.        Medications:     Current Outpatient Medications:     baclofen (LIORESAL) 10 MG tablet, Take 1-2 tablets by mouth Daily., Disp: , Rfl:     Cequa 0.09 % solution, Apply 1 drop to eye(s) as directed by provider 2 (Two) Times a Day., Disp: , Rfl:     furosemide (LASIX) 20 MG tablet, Take 1 tablet by mouth Daily., Disp: 30 tablet, Rfl: 11    gabapentin (NEURONTIN) 800 MG tablet, Take 1.5 tablets by mouth every night at bedtime., Disp: , Rfl:     hydrOXYzine (ATARAX) 25 MG tablet, Take 1-2 mg by mouth Every 12 (Twelve) Hours., Disp: , Rfl:     naloxone (NARCAN) 4 MG/0.1ML nasal spray, 1 spray into the nostril(s) as directed by provider As Needed., Disp: , Rfl:     oxyCODONE (ROXICODONE) 10 MG tablet,  "Take 1 tablet by mouth Every 3 (Three) Hours As Needed., Disp: , Rfl:     pravastatin (PRAVACHOL) 10 MG tablet, Take 1 tablet by mouth Every Night., Disp: , Rfl:     lisinopril (PRINIVIL,ZESTRIL) 10 MG tablet, Take 1 tablet by mouth Daily., Disp: 30 tablet, Rfl: 11    potassium chloride 10 MEQ CR tablet, Take 1 tablet by mouth Daily., Disp: 30 tablet, Rfl: 11    Allergies:   Allergies   Allergen Reactions    Carbamazepine Rash and Unknown - High Severity    Amitriptyline Other (See Comments)    Bupropion Rash and Unknown - High Severity    Hydromorphone Rash and Unknown - High Severity     Tolerated IV, rash broke out with PO form    Lamotrigine Unknown - Low Severity, Rash and Unknown - High Severity    Levetiracetam Unknown - Low Severity and Rash    Oxcarbazepine Unknown - Low Severity, Rash and Unknown - High Severity    Penicillins Unknown - High Severity and Unknown (See Comments)     Was positive on allergy test, never taken    Sulfa Antibiotics Rash    Tolmetin Rash and Unknown - High Severity    Valproic Acid Unknown - Low Severity, Other (See Comments) and Unknown - High Severity     Visual changes, flashing lights       Objective     Physical Exam:  Vitals:    01/18/24 1419   BP: 130/70   BP Location: Left arm   Patient Position: Sitting   Pulse: 92   SpO2: 97%   Weight: 50.4 kg (111 lb 3.2 oz)   Height: 154.9 cm (61\")     Body mass index is 21.01 kg/m².      Constitutional:       General: Not in acute distress.     Appearance: Healthy appearance. Not in distress.     Neck:     JVP: Not elevated     Carotid artery: No carotid bruit    Pulmonary:      Effort: Pulmonary effort is normal.      Breath sounds: Normal breath sounds. No wheezing. No rhonchi. No rales.     Cardiovascular:      Regular rate.  Regular rhythm. Normal S1. Normal S2.      Murmurs: There is no significant murmur.      No gallop. No click. No rub.     Abdominal:      General: Bowel sounds are normal.      Palpations: Abdomen is soft.    "   Tenderness: There is no abdominal tenderness.    Extremities:     Pulses: Good distal pulses     Edema: No edema    Smoking Cessation:   She quit smoking in 1984    Lab Review:   Lab Results   Component Value Date    GLUCOSE 94 09/05/2023    BUN 13 09/05/2023    CREATININE 0.80 09/05/2023    BCR 16.3 09/05/2023    K 3.9 09/05/2023    CO2 25.0 09/05/2023    CALCIUM 9.6 09/05/2023    PROTENTOTREF 7.2 09/05/2023    ALBUMIN 4.5 09/05/2023    ALBUMIN 4.0 09/05/2023    LABIL2 1.3 09/05/2023    AST 21 09/05/2023    ALT 13 09/05/2023     Lab Results   Component Value Date    WBC 4.47 09/05/2023    HGB 11.1 (L) 09/05/2023    HCT 35.7 09/05/2023    MCV 94.2 09/05/2023     09/05/2023       Lab Results   Component Value Date    HGBA1C 5.9 (H) 05/30/2023           Assessment / Plan      Assessment:   Diagnosis Plan   1. Congestive heart failure, unspecified HF chronicity, unspecified heart failure type        2. Essential hypertension             Plan:  We will start patient on lisinopril for her heart failure as she can not afford Entresto.  I have started her on Lasix and potassium and will check her blood work in few weeks.  We might need to start her on spironolactone on her next visit.      Follow Up:       Return in about 4 weeks (around 2/15/2024).    Sohail Palencia MD

## 2024-02-08 DIAGNOSIS — I50.33 ACUTE ON CHRONIC HEART FAILURE WITH PRESERVED EJECTION FRACTION (HFPEF): Primary | ICD-10-CM

## 2024-02-12 DIAGNOSIS — I50.43 ACUTE ON CHRONIC COMBINED SYSTOLIC AND DIASTOLIC CHF (CONGESTIVE HEART FAILURE): Primary | ICD-10-CM

## 2024-02-14 LAB
BNP SERPL-MCNC: 108.3 PG/ML (ref 0–100)
BUN SERPL-MCNC: 19 MG/DL (ref 8–27)
BUN/CREAT SERPL: 21 (ref 12–28)
CALCIUM SERPL-MCNC: 9.5 MG/DL (ref 8.7–10.3)
CHLORIDE SERPL-SCNC: 105 MMOL/L (ref 96–106)
CO2 SERPL-SCNC: 23 MMOL/L (ref 20–29)
CREAT SERPL-MCNC: 0.89 MG/DL (ref 0.57–1)
EGFRCR SERPLBLD CKD-EPI 2021: 70 ML/MIN/1.73
GLUCOSE SERPL-MCNC: 95 MG/DL (ref 70–99)
POTASSIUM SERPL-SCNC: 4.6 MMOL/L (ref 3.5–5.2)
SODIUM SERPL-SCNC: 141 MMOL/L (ref 134–144)

## 2024-02-14 NOTE — PROGRESS NOTES
Follow-up Visit      Date: 02/15/2024  Patient Name: Linnette Luna  : 1955   MRN: [id number]     Chief Complaint:    Chief Complaint   Patient presents with    Congestive Heart Failure     Congestive heart failure, unspecified HF chronicity, unspecified heart failure type           History of Present Illness: Linnette Luna is a 68 y.o. female who is here today for follow-up on her diastolic dysfunction.  Patient has been doing much better.  Patient denies any chest pain or lower extremity edema.  Patient is still having cough and some shortness of breath.  Patient denies any paroxysmal nocturnal dyspnea.  Patient took Lasix sometime ago but has not taken in the last week as she was feeling much better.    Patient denies any weight gain or any weight loss.      Problem List     CARDIAC  Coronary Artery Disease:   2023 Normal stress test  Dyspnea    Myocardium:   Echo, 2023: LVEF 46 to 50%, G2 DD, LVH, stage C, NYHA class II     Valvular:   Moderate AI     Electrical:   NSR     Percardium:   Normal     VASCULAR:  Venous:  CT/Doppler: negative     CARDIAC RISK FACTORS:  Hypertension  Dyslipidemia  Tobacco Use: Former Smoker    NON-CARDIAC:  Iron deficiency anemia    SURGERIES:  Left knee replacement      Subjective      Review of Systems:   Review of Systems   Respiratory:  Positive for cough and shortness of breath.    Cardiovascular: Negative.        Medications:     Current Outpatient Medications:     baclofen (LIORESAL) 10 MG tablet, Take 1-2 tablets by mouth Daily As Needed (takes one every 4 to 5 days)., Disp: , Rfl:     Cequa 0.09 % solution, Apply 1 drop to eye(s) as directed by provider 2 (Two) Times a Day., Disp: , Rfl:     furosemide (LASIX) 20 MG tablet, Take 1 tablet by mouth Daily., Disp: 30 tablet, Rfl: 11    gabapentin (NEURONTIN) 800 MG tablet, Take 1.5 tablets by mouth every night at bedtime., Disp: , Rfl:     hydrOXYzine (ATARAX) 25 MG tablet, Take 1-2 mg by mouth Every 12  "(Twelve) Hours., Disp: , Rfl:     lisinopril (PRINIVIL,ZESTRIL) 10 MG tablet, Take 1 tablet by mouth Daily., Disp: 30 tablet, Rfl: 11    naloxone (NARCAN) 4 MG/0.1ML nasal spray, 1 spray into the nostril(s) as directed by provider As Needed., Disp: , Rfl:     oxyCODONE (ROXICODONE) 10 MG tablet, Take 1 tablet by mouth Every 3 (Three) Hours As Needed., Disp: , Rfl:     potassium chloride 10 MEQ CR tablet, Take 1 tablet by mouth Daily., Disp: 30 tablet, Rfl: 11    pravastatin (PRAVACHOL) 10 MG tablet, Take 1 tablet by mouth Every Night., Disp: , Rfl:     spironolactone (ALDACTONE) 25 MG tablet, Take 1 tablet by mouth Daily., Disp: 30 tablet, Rfl: 11    Allergies:   Allergies   Allergen Reactions    Carbamazepine Rash and Unknown - High Severity    Amitriptyline Other (See Comments)    Bupropion Rash and Unknown - High Severity    Hydromorphone Rash and Unknown - High Severity     Tolerated IV, rash broke out with PO form    Lamotrigine Unknown - Low Severity, Rash and Unknown - High Severity    Levetiracetam Unknown - Low Severity and Rash    Oxcarbazepine Unknown - Low Severity, Rash and Unknown - High Severity    Penicillins Unknown - High Severity and Unknown (See Comments)     Was positive on allergy test, never taken    Sulfa Antibiotics Rash    Tolmetin Rash and Unknown - High Severity    Valproic Acid Unknown - Low Severity, Other (See Comments) and Unknown - High Severity     Visual changes, flashing lights       Objective     Physical Exam:  Vitals:    02/15/24 1514   BP: 140/68   BP Location: Right arm   Patient Position: Sitting   Pulse: 67   SpO2: 98%   Weight: 50.3 kg (110 lb 12.8 oz)   Height: 154.9 cm (61\")     Body mass index is 20.94 kg/m².      Constitutional:       General: Not in acute distress.     Appearance: Healthy appearance. Not in distress.     Neck:     JVP: Around 10 cm water     Carotid artery: No carotid bruit    Pulmonary:      Effort: Pulmonary effort is normal.      Breath sounds: " "Normal breath sounds. No wheezing. No rhonchi. No rales.     Cardiovascular:      Regular rate.  Regular rhythm. Normal S1. Normal S2.      Murmurs: There is mild systolic murmur.      No gallop. No click. No rub.     Abdominal:      General: Bowel sounds are normal.      Palpations: Abdomen is soft.      Tenderness: There is no abdominal tenderness.    Extremities:     Pulses: Decreased pulses     Edema: No edema    Smoking Cessation:   Tobacco Product History : Patient quit smoking in 1980 after 10 pack years     Lab Review:   Lab Results   Component Value Date    GLUCOSE 95 02/13/2024    BUN 19 02/13/2024    CREATININE 0.89 02/13/2024    BCR 21 02/13/2024    K 4.6 02/13/2024    CO2 23 02/13/2024    CALCIUM 9.5 02/13/2024    PROTENTOTREF 7.2 09/05/2023    ALBUMIN 4.5 09/05/2023    ALBUMIN 4.0 09/05/2023    LABIL2 1.3 09/05/2023    AST 21 09/05/2023    ALT 13 09/05/2023     Lab Results   Component Value Date    WBC 4.47 09/05/2023    HGB 11.1 (L) 09/05/2023    HCT 35.7 09/05/2023    MCV 94.2 09/05/2023     09/05/2023     No results found for: \"TSH\"        ECG 12 Lead    Date/Time: 2/15/2024 3:37 PM  Performed by: Sohail Palencia MD    Authorized by: Sohail Palencia MD  Comparison: compared with previous ECG from 11/9/2023  Similar to previous ECG  Comparison to previous ECG: PVC has disappeared  Rhythm: sinus rhythm    Clinical impression: abnormal EKG           Assessment / Plan      Assessment:   Diagnosis Plan   1. Acute on chronic combined systolic and diastolic CHF (congestive heart failure)  Basic Metabolic Panel    ECG 12 Lead      2. Essential hypertension  Basic Metabolic Panel      3. Mixed hyperlipidemia             Plan:  Patient has been stable on her heart failure symptoms.  Patient will continue to use her Lasix on as-needed basis.    Patient could not afford to be on SGLT2  We will start her on spironolactone and check her BMP in a week      Follow Up:       Return in about 6 months (around " 8/15/2024).    Sohail Palencia MD

## 2024-02-15 ENCOUNTER — OFFICE VISIT (OUTPATIENT)
Dept: CARDIOLOGY | Facility: CLINIC | Age: 69
End: 2024-02-15
Payer: MEDICARE

## 2024-02-15 VITALS
DIASTOLIC BLOOD PRESSURE: 68 MMHG | SYSTOLIC BLOOD PRESSURE: 140 MMHG | OXYGEN SATURATION: 98 % | HEIGHT: 61 IN | WEIGHT: 110.8 LBS | HEART RATE: 67 BPM | BODY MASS INDEX: 20.92 KG/M2

## 2024-02-15 DIAGNOSIS — E78.2 MIXED HYPERLIPIDEMIA: ICD-10-CM

## 2024-02-15 DIAGNOSIS — I10 ESSENTIAL HYPERTENSION: ICD-10-CM

## 2024-02-15 DIAGNOSIS — I50.43 ACUTE ON CHRONIC COMBINED SYSTOLIC AND DIASTOLIC CHF (CONGESTIVE HEART FAILURE): Primary | ICD-10-CM

## 2024-02-15 RX ORDER — SPIRONOLACTONE 25 MG/1
25 TABLET ORAL DAILY
Qty: 30 TABLET | Refills: 11 | Status: SHIPPED | OUTPATIENT
Start: 2024-02-15

## 2024-02-25 RX ORDER — FUROSEMIDE 20 MG/1
20 TABLET ORAL DAILY
Start: 2024-02-25

## 2024-02-27 ENCOUNTER — LAB (OUTPATIENT)
Dept: LAB | Facility: HOSPITAL | Age: 69
End: 2024-02-27
Payer: MEDICARE

## 2024-02-27 DIAGNOSIS — I50.9 CONGESTIVE HEART FAILURE, UNSPECIFIED HF CHRONICITY, UNSPECIFIED HEART FAILURE TYPE: ICD-10-CM

## 2024-02-27 DIAGNOSIS — R06.02 SOB (SHORTNESS OF BREATH): ICD-10-CM

## 2024-02-27 DIAGNOSIS — I50.43 ACUTE ON CHRONIC COMBINED SYSTOLIC AND DIASTOLIC CHF (CONGESTIVE HEART FAILURE): ICD-10-CM

## 2024-02-27 DIAGNOSIS — I50.33 ACUTE ON CHRONIC HEART FAILURE WITH PRESERVED EJECTION FRACTION (HFPEF): ICD-10-CM

## 2024-02-27 LAB
ANION GAP SERPL CALCULATED.3IONS-SCNC: 11.1 MMOL/L (ref 5–15)
BUN SERPL-MCNC: 19 MG/DL (ref 8–23)
BUN/CREAT SERPL: 19.4 (ref 7–25)
CALCIUM SPEC-SCNC: 9.3 MG/DL (ref 8.6–10.5)
CHLORIDE SERPL-SCNC: 107 MMOL/L (ref 98–107)
CO2 SERPL-SCNC: 25.9 MMOL/L (ref 22–29)
CREAT SERPL-MCNC: 0.98 MG/DL (ref 0.57–1)
EGFRCR SERPLBLD CKD-EPI 2021: 62.6 ML/MIN/1.73
GLUCOSE SERPL-MCNC: 98 MG/DL (ref 65–99)
NT-PROBNP SERPL-MCNC: 404 PG/ML (ref 0–900)
NT-PROBNP SERPL-MCNC: 411 PG/ML (ref 0–900)
POTASSIUM SERPL-SCNC: 4 MMOL/L (ref 3.5–5.2)
SODIUM SERPL-SCNC: 144 MMOL/L (ref 136–145)

## 2024-02-27 PROCEDURE — 80048 BASIC METABOLIC PNL TOTAL CA: CPT

## 2024-02-27 PROCEDURE — 36415 COLL VENOUS BLD VENIPUNCTURE: CPT

## 2024-02-27 PROCEDURE — 83880 ASSAY OF NATRIURETIC PEPTIDE: CPT

## 2024-02-29 ENCOUNTER — TELEPHONE (OUTPATIENT)
Dept: CARDIOLOGY | Facility: CLINIC | Age: 69
End: 2024-02-29
Payer: MEDICARE

## 2024-08-14 NOTE — PROGRESS NOTES
Follow-up Visit      Date: 08/15/2024  Patient Name: Linnette Luna  : 1955   MRN: [id number]     Chief Complaint:    Chief Complaint   Patient presents with    Acute on chronic combined systolic and diastolic CHF (conge       History of Present Illness: Linnette Luna is a 69 y.o. female who is here today for her follow-up on heart failure.  Patient has a history of heart failure with mildly reduced ejection fraction.  Patient was started on guideline directed medical therapy and she is starting having more dizziness fatigue and hypotensive episodes.  Her lisinopril was decreased to 5 mg daily.  She is still having episodes of orthostatic hypotension.  Her breathing has improved a lot.    She denies any lower extremity edema any paroxysmal nocturnal dyspnea or any bleeding problem at this time.      Problem List     CARDIAC  Coronary Artery Disease:   2023 Normal stress test  Dyspnea    Myocardium:   Echo, 2023: LVEF 46 to 50%, G2 DD, LVH, stage C, NYHA class II     Valvular:   Moderate AI     Electrical:   NSR     Percardium:   Normal     CARDIAC RISK FACTORS  Hypertension  Dyslipidemia  2024  TG 74 HDL 69 LDL 85  Tobacco Use: Former Smoker    NON-CARDIAC  Iron deficiency anemia    SURGERIES  Left knee replacement      Subjective      Review of Systems:   Review of Systems   Respiratory:  Negative for apnea, cough, choking, chest tightness, shortness of breath, wheezing and stridor.    Cardiovascular:  Negative for chest pain, palpitations and leg swelling.       Medications:     Current Outpatient Medications:     Cequa 0.09 % solution, Apply 1 drop to eye(s) as directed by provider 2 (Two) Times a Day., Disp: , Rfl:     gabapentin (NEURONTIN) 800 MG tablet, Take 1.5 tablets by mouth every night at bedtime., Disp: , Rfl:     hydrOXYzine (ATARAX) 25 MG tablet, Take 1-2 mg by mouth Every 12 (Twelve) Hours., Disp: , Rfl:     lisinopril (PRINIVIL,ZESTRIL) 10 MG tablet, Take 1 tablet by mouth  "Daily. (Patient taking differently: Take 0.5 tablets by mouth Daily.), Disp: 30 tablet, Rfl: 11    methocarbamol (ROBAXIN) 500 MG tablet, Take 1 tablet by mouth As Needed for Muscle Spasms., Disp: , Rfl:     naloxone (NARCAN) 4 MG/0.1ML nasal spray, 1 spray into the nostril(s) as directed by provider As Needed., Disp: , Rfl:     oxyCODONE (ROXICODONE) 10 MG tablet, Take 1 tablet by mouth Every 3 (Three) Hours As Needed., Disp: , Rfl:     pravastatin (PRAVACHOL) 10 MG tablet, Take 1 tablet by mouth Every Night., Disp: , Rfl:     spironolactone (ALDACTONE) 25 MG tablet, Take 0.5 tablets by mouth Daily., Disp: 30 tablet, Rfl: 11    Allergies:   Allergies   Allergen Reactions    Carbamazepine Rash and Unknown - High Severity    Amitriptyline Other (See Comments)    Bupropion Rash and Unknown - High Severity    Hydromorphone Rash and Unknown - High Severity     Tolerated IV, rash broke out with PO form    Lamotrigine Unknown - Low Severity, Rash and Unknown - High Severity    Levetiracetam Unknown - Low Severity and Rash    Oxcarbazepine Unknown - Low Severity, Rash and Unknown - High Severity    Penicillins Unknown - High Severity and Unknown (See Comments)     Was positive on allergy test, never taken    Sulfa Antibiotics Rash    Tolmetin Rash and Unknown - High Severity    Valproic Acid Unknown - Low Severity, Other (See Comments) and Unknown - High Severity     Visual changes, flashing lights       Objective     Physical Exam:  Vitals:    08/15/24 1142 08/15/24 1146   BP: 132/68 134/68   BP Location: Right arm Right arm   Patient Position: Sitting Standing   Cuff Size: Adult Adult   Pulse: 113 116   SpO2: 100%    Weight: 47.8 kg (105 lb 6.4 oz)    Height: 154.9 cm (61\")      Body mass index is 19.92 kg/m².    Constitutional:       General: Not in acute distress.     Appearance: Healthy appearance. Not in distress.     Neck:     JVP:Not elevated     Carotid artery: Normal    Pulmonary:      Effort: Pulmonary effort is " normal.      Breath sounds: Normal breath sounds. No wheezing. No rhonchi. No rales.     Cardiovascular:      Normal rate. Regular rhythm. Normal S1. Normal S2.      Murmurs: There is mild systolic murmur.      No gallop. No click. No rub.     Abdominal:      General: Bowel sounds are normal.      Palpations: Abdomen is soft.      Tenderness: There is no abdominal tenderness.    Extremities:     Pulses:Normal radial and pedal pulses     Edema:no edema    Smoking Cessation:   Tobacco Product History : Patient quit smoking in 1980  after 11 pack years     Lab Review:   Lab Results   Component Value Date    GLUCOSE 98 02/27/2024    BUN 19 02/27/2024    CREATININE 0.98 02/27/2024    BCR 19.4 02/27/2024    K 4.0 02/27/2024    CO2 25.9 02/27/2024    CALCIUM 9.3 02/27/2024    PROTENTOTREF 7.2 09/05/2023    ALBUMIN 4.5 09/05/2023    ALBUMIN 4.0 09/05/2023    LABIL2 1.3 09/05/2023    AST 21 09/05/2023    ALT 13 09/05/2023     Lab Results   Component Value Date    WBC 4.47 09/05/2023    HGB 11.1 (L) 09/05/2023    HCT 35.7 09/05/2023    MCV 94.2 09/05/2023     09/05/2023           Assessment / Plan      Assessment:   Diagnosis Plan   1. Congestive heart failure, unspecified HF chronicity, unspecified heart failure type        2. Mixed hyperlipidemia        3. Chronic hypotension             Plan:  I believe patient has been stable as far as heart failure is concerned.  She has to occasionally use her water pill.  Otherwise she does not have any evidence of volume overload.  I have advised her to cut down her spironolactone to 12.5 mg daily and see if her blood pressure improves.  I also advised her to split the medication between morning and evening for better blood pressure regulation.  If her blood pressure does not improve I have advised her to stop her Spironolactone.  I believe most of her symptoms are related to low blood pressure.      Follow Up:       Return in about 1 year (around 8/15/2025).    Sohail Palencia,  MD

## 2024-08-15 ENCOUNTER — OFFICE VISIT (OUTPATIENT)
Dept: CARDIOLOGY | Facility: CLINIC | Age: 69
End: 2024-08-15
Payer: MEDICARE

## 2024-08-15 VITALS
BODY MASS INDEX: 19.9 KG/M2 | OXYGEN SATURATION: 100 % | HEART RATE: 116 BPM | WEIGHT: 105.4 LBS | SYSTOLIC BLOOD PRESSURE: 134 MMHG | HEIGHT: 61 IN | DIASTOLIC BLOOD PRESSURE: 68 MMHG

## 2024-08-15 DIAGNOSIS — I95.89 CHRONIC HYPOTENSION: ICD-10-CM

## 2024-08-15 DIAGNOSIS — I50.9 CONGESTIVE HEART FAILURE, UNSPECIFIED HF CHRONICITY, UNSPECIFIED HEART FAILURE TYPE: Primary | ICD-10-CM

## 2024-08-15 DIAGNOSIS — E78.2 MIXED HYPERLIPIDEMIA: ICD-10-CM

## 2024-08-15 PROCEDURE — 99214 OFFICE O/P EST MOD 30 MIN: CPT | Performed by: INTERNAL MEDICINE

## 2024-08-15 RX ORDER — METHOCARBAMOL 500 MG/1
500 TABLET, FILM COATED ORAL AS NEEDED
COMMUNITY

## 2024-08-15 RX ORDER — SPIRONOLACTONE 25 MG/1
12.5 TABLET ORAL DAILY
Qty: 30 TABLET | Refills: 11 | Status: SHIPPED | OUTPATIENT
Start: 2024-08-15

## 2025-08-21 ENCOUNTER — OFFICE VISIT (OUTPATIENT)
Dept: CARDIOLOGY | Facility: CLINIC | Age: 70
End: 2025-08-21
Payer: MEDICARE

## 2025-08-21 VITALS
SYSTOLIC BLOOD PRESSURE: 124 MMHG | HEIGHT: 61 IN | BODY MASS INDEX: 20.5 KG/M2 | HEART RATE: 74 BPM | WEIGHT: 108.6 LBS | DIASTOLIC BLOOD PRESSURE: 74 MMHG | OXYGEN SATURATION: 98 %

## 2025-08-21 DIAGNOSIS — I50.9 CONGESTIVE HEART FAILURE, UNSPECIFIED HF CHRONICITY, UNSPECIFIED HEART FAILURE TYPE: Primary | ICD-10-CM

## 2025-08-21 DIAGNOSIS — E78.2 MIXED HYPERLIPIDEMIA: ICD-10-CM

## 2025-08-21 DIAGNOSIS — I10 ESSENTIAL HYPERTENSION: ICD-10-CM

## 2025-08-21 RX ORDER — CYCLOSPORINE 0 G/ML
SOLUTION/ DROPS OPHTHALMIC; TOPICAL EVERY 12 HOURS SCHEDULED
COMMUNITY
End: 2025-08-21

## 2025-08-21 RX ORDER — LISINOPRIL 5 MG/1
5 TABLET ORAL DAILY
COMMUNITY

## 2025-08-21 RX ORDER — SPIRONOLACTONE 25 MG/1
12.5 TABLET ORAL EVERY OTHER DAY
Qty: 30 TABLET | Refills: 11 | Status: SHIPPED | OUTPATIENT
Start: 2025-08-21